# Patient Record
Sex: FEMALE
[De-identification: names, ages, dates, MRNs, and addresses within clinical notes are randomized per-mention and may not be internally consistent; named-entity substitution may affect disease eponyms.]

---

## 2024-09-05 ENCOUNTER — ENROLLMENT (OUTPATIENT)
Dept: HOME HEALTH SERVICES | Facility: HOME HEALTH | Age: 59
End: 2024-09-05
Payer: COMMERCIAL

## 2024-10-03 ENCOUNTER — HOME INFUSION (OUTPATIENT)
Dept: HOME HEALTH SERVICES | Facility: HOME HEALTH | Age: 59
End: 2024-10-03
Payer: COMMERCIAL

## 2024-10-03 DIAGNOSIS — K50.90 CROHN DISEASE (H): ICD-10-CM

## 2024-10-03 DIAGNOSIS — K50.90 CROHN'S DISEASE WITHOUT COMPLICATION, UNSPECIFIED GASTROINTESTINAL TRACT LOCATION (H): Primary | ICD-10-CM

## 2024-10-08 VITALS — WEIGHT: 190 LBS | HEIGHT: 67 IN | BODY MASS INDEX: 29.82 KG/M2

## 2024-10-08 PROBLEM — K50.90 CROHN'S DISEASE (H): Status: ACTIVE | Noted: 2024-10-08

## 2024-10-08 RX ORDER — DIPHENHYDRAMINE HYDROCHLORIDE 50 MG/ML
50 INJECTION INTRAMUSCULAR; INTRAVENOUS PRN
Qty: 99999 ML | Refills: 0 | Status: ACTIVE | OUTPATIENT
Start: 2024-10-23 | End: 2025-02-11

## 2024-10-08 RX ORDER — EPINEPHRINE 0.3 MG/.3ML
0.3 INJECTION SUBCUTANEOUS PRN
Qty: 999999 ML | Refills: 0 | Status: ACTIVE | OUTPATIENT
Start: 2024-10-23 | End: 2025-02-11

## 2024-10-08 RX ORDER — SODIUM CHLORIDE 9 MG/ML
500 INJECTION, SOLUTION INTRAVENOUS PRN
Qty: 999999 ML | Refills: 0 | Status: ACTIVE | OUTPATIENT
Start: 2024-10-23 | End: 2025-02-11

## 2024-10-08 RX ORDER — WATER 10 ML/10ML
40 INJECTION INTRAMUSCULAR; INTRAVENOUS; SUBCUTANEOUS
Qty: 100 ML | Refills: 0 | Status: DISPENSED | OUTPATIENT
Start: 2024-10-23 | End: 2025-02-11

## 2024-10-08 RX ORDER — INFLIXIMAB 100 MG/10ML
400 INJECTION, POWDER, LYOPHILIZED, FOR SOLUTION INTRAVENOUS
Qty: 9 EACH | Refills: 0 | Status: DISPENSED | OUTPATIENT
Start: 2024-10-23 | End: 2025-02-11

## 2024-11-04 ENCOUNTER — HOME INFUSION BILLING (OUTPATIENT)
Dept: HOME HEALTH SERVICES | Facility: HOME HEALTH | Age: 59
End: 2024-11-04
Payer: COMMERCIAL

## 2024-11-04 PROCEDURE — A4930 STERILE, GLOVES PER PAIR: HCPCS

## 2024-11-04 PROCEDURE — A4217 STERILE WATER/SALINE, 500 ML: HCPCS

## 2024-11-04 PROCEDURE — A4213 20+ CC SYRINGE ONLY: HCPCS

## 2024-11-04 PROCEDURE — S1015 IV TUBING EXTENSION SET: HCPCS

## 2024-11-04 PROCEDURE — A4215 STERILE NEEDLE: HCPCS

## 2024-11-06 ENCOUNTER — HOME CARE VISIT (OUTPATIENT)
Dept: HOME HEALTH SERVICES | Facility: HOME HEALTH | Age: 59
End: 2024-11-06
Payer: COMMERCIAL

## 2024-11-06 VITALS
HEART RATE: 70 BPM | DIASTOLIC BLOOD PRESSURE: 68 MMHG | SYSTOLIC BLOOD PRESSURE: 108 MMHG | RESPIRATION RATE: 16 BRPM | TEMPERATURE: 98 F

## 2024-11-06 PROCEDURE — S9359 HIT ANTI-TNF PER DIEM: HCPCS

## 2024-11-06 NOTE — PROGRESS NOTES
"Nursing Visit Note:  Nurse visit today for PIV start and remove once infusion is complete, IV Infliximab 400mg for Crohn s every 8 weeks for Jami Felder.     present during visit today: Not Applicable.    Infusion Nursing Note:  Skilled nurse visit today for PIV start and remove once infusion is complete, IV Infliximab 400mg via gravity for Crohn s  for Jami Felder.   present during visit today: Not Applicable.    Pre-infusion Checklist:   Pre-infusion Checklist    Have you had any delayed reaction since last infusion?   No    Have you recently had an elevated temperature, fever, chills productive cough, coughing for 3 weeks or longer or hemoptysis, abnormal vital signs, night sweats, chest pain, or have you noticed a decrease in your appetite, or noted unexplained weight loss or fatigue?   No    Do you have any open wounds or new incisions?  No    Do you have any recent or upcoming hospitalizations, surgeries, or dental procedures?   No    Do you currently have or recently have had any signs of illness or infection or are you on any antibiotics?   No    Have you had any new, sudden , or worsening abdominal pain?   No    Have you or anyone in your household received a live vaccination in the past 4 weeks?   No    Have you recently been diagnosed with any new nervous system diseases or cancer diagnosis? (i.e., Multiple Sclerosis, Guillain [place], sezures, neurological changes) Are you receiving any form of radiation or chemotherapy?   No    Are you pregnant or breastfeeding, or do you have plans of pregnancy in the future?   No    Have you been having any signs of worsening depression or suicidal ideation?  No    Have there been any other new onset medical symptoms?  No    Did the patient answer \"YES\" to any of the questions above?  No    Will the patient receive a medication that has an order for infusion reaction management?  Yes, and all drugs and supplies are available and none have " .    If ordered, has the patient taken pre-medications?  No    Plan:   Therapy is appropriate, will proceed with treatment.     Chemotherapy Treatment Conditions:   Not Applicable    Intravenous Access:  Peripheral IV placed.    Post Infusion Assessment:  Patient tolerated infusion without incident.     Note: PIV start and remove once infusion is complete   IV Infliximab via gravity to be infused over an hour via gravity, every 8 weeks for Crohn s  patient states medication is effective at this time  Sterile water 4 vials, 10mls each lot number ([id number], exp., 2027)  no premedications ordered  Normal saline syringes that were used today was 40mls  Patient tolerated infusion with no complications       Next Visit Plan:    at 9am, this is ok with patient       Alfredo Ruiz RN 2024       Alfredo Ruiz RN 2024

## 2024-12-30 ENCOUNTER — HOME INFUSION BILLING (OUTPATIENT)
Dept: HOME HEALTH SERVICES | Facility: HOME HEALTH | Age: 59
End: 2024-12-30
Payer: COMMERCIAL

## 2024-12-30 PROCEDURE — S1015 IV TUBING EXTENSION SET: HCPCS

## 2024-12-30 PROCEDURE — A4215 STERILE NEEDLE: HCPCS

## 2024-12-30 PROCEDURE — A4213 20+ CC SYRINGE ONLY: HCPCS

## 2024-12-30 PROCEDURE — A4217 STERILE WATER/SALINE, 500 ML: HCPCS | Mod: JZ

## 2024-12-30 PROCEDURE — A4930 STERILE, GLOVES PER PAIR: HCPCS

## 2025-01-02 ENCOUNTER — HOME CARE VISIT (OUTPATIENT)
Dept: HOME HEALTH SERVICES | Facility: HOME HEALTH | Age: 60
End: 2025-01-02
Payer: COMMERCIAL

## 2025-01-02 ENCOUNTER — TRANSFERRED RECORDS (OUTPATIENT)
Dept: HEALTH INFORMATION MANAGEMENT | Facility: CLINIC | Age: 60
End: 2025-01-02

## 2025-01-02 VITALS
DIASTOLIC BLOOD PRESSURE: 64 MMHG | RESPIRATION RATE: 16 BRPM | SYSTOLIC BLOOD PRESSURE: 118 MMHG | TEMPERATURE: 98.6 F | OXYGEN SATURATION: 96 % | HEART RATE: 71 BPM

## 2025-01-02 PROCEDURE — S9359 HIT ANTI-TNF PER DIEM: HCPCS

## 2025-01-02 NOTE — PROGRESS NOTES
"Nursing Visit Note:  Nurse visit today for Infliximab IV via gravity   PIV start and remove once infusion is completed  for Jami Felder.     present during visit today: Not Applicable.    Infusion Nursing Note:  Skilled nurse visit today for IV Infliximab 400mg via gravity for Jami Felder.   present during visit today: Not Applicable.    Pre-infusion Checklist:   Pre-infusion Checklist    Have you had any delayed reaction since last infusion?   No    Have you recently had an elevated temperature, fever, chills productive cough, coughing for 3 weeks or longer or hemoptysis, abnormal vital signs, night sweats, chest pain, or have you noticed a decrease in your appetite, or noted unexplained weight loss or fatigue?   No    Do you have any open wounds or new incisions?  No    Do you have any recent or upcoming hospitalizations, surgeries, or dental procedures?   No    Do you currently have or recently have had any signs of illness or infection or are you on any antibiotics?   No    Have you had any new, sudden , or worsening abdominal pain?   No    Have you or anyone in your household received a live vaccination in the past 4 weeks?   No    Have you recently been diagnosed with any new nervous system diseases or cancer diagnosis? (i.e., Multiple Sclerosis, Guillain [place], sezures, neurological changes) Are you receiving any form of radiation or chemotherapy?   No    Are you pregnant or breastfeeding, or do you have plans of pregnancy in the future?   No    Have you been having any signs of worsening depression or suicidal ideation?  No    Have there been any other new onset medical symptoms?  No    Did the patient answer \"YES\" to any of the questions above?  No    Will the patient receive a medication that has an order for infusion reaction management?  Yes, and all drugs and supplies are available and none have .    If ordered, has the patient taken pre-medications?  No    Plan: "   Therapy is appropriate, will proceed with treatment.     Chemotherapy Treatment Conditions:   Not Applicable    Intravenous Access:  Peripheral IV placed.    Post Infusion Assessment:  Patient tolerated infusion without incident.  Access discontinued per protocol.       Note: PIV started and removed once infusion is completed   IV Infliximab 400mg via gravity every 8 weeks  Patient states medication is effective at this time  sterile water 4 vials, 10mls each lot number ([id number], exp., 10/2027).        Saline administered (in mLs): 50    Next Visit Plan:   February 27th at 10am, with Lissette CASTELLANOS, everyone is ok with this       Alfredo Ruiz RN 1/2/2025       Alfredo Ruiz RN 1/2/2025

## 2025-02-09 ENCOUNTER — EPISODE UPDATE (OUTPATIENT)
Dept: HOME HEALTH SERVICES | Facility: HOME HEALTH | Age: 60
End: 2025-02-09
Payer: COMMERCIAL

## 2025-02-17 ENCOUNTER — MEDICAL CORRESPONDENCE (OUTPATIENT)
Dept: HOME HEALTH SERVICES | Facility: HOME HEALTH | Age: 60
End: 2025-02-17
Payer: COMMERCIAL

## 2025-02-24 ENCOUNTER — HOME INFUSION BILLING (OUTPATIENT)
Dept: HOME HEALTH SERVICES | Facility: HOME HEALTH | Age: 60
End: 2025-02-24
Payer: COMMERCIAL

## 2025-02-24 PROCEDURE — A4930 STERILE, GLOVES PER PAIR: HCPCS

## 2025-02-24 PROCEDURE — S1015 IV TUBING EXTENSION SET: HCPCS

## 2025-02-24 PROCEDURE — A4213 20+ CC SYRINGE ONLY: HCPCS

## 2025-02-24 PROCEDURE — A4215 STERILE NEEDLE: HCPCS

## 2025-02-24 PROCEDURE — A4217 STERILE WATER/SALINE, 500 ML: HCPCS | Mod: JZ

## 2025-02-27 ENCOUNTER — HOME CARE VISIT (OUTPATIENT)
Dept: HOME HEALTH SERVICES | Facility: HOME HEALTH | Age: 60
End: 2025-02-27
Payer: COMMERCIAL

## 2025-02-27 VITALS
RESPIRATION RATE: 16 BRPM | DIASTOLIC BLOOD PRESSURE: 82 MMHG | BODY MASS INDEX: 29.82 KG/M2 | HEART RATE: 66 BPM | WEIGHT: 190 LBS | TEMPERATURE: 97.1 F | OXYGEN SATURATION: 96 % | SYSTOLIC BLOOD PRESSURE: 124 MMHG

## 2025-02-27 PROCEDURE — S9359 HIT ANTI-TNF PER DIEM: HCPCS

## 2025-02-27 NOTE — PROGRESS NOTES
Nursing Visit Note:  Nurse visit today for Infliximab infusion for Jami Felder.     present during visit today: Not Applicable.    Intravenous Access:  Peripheral IV placed.    Infusion Nursing Note:    Pre-infusion Checklist:   Have you had any delayed reaction since last infusion?   No     Have you recently had an elevated temperature, fever, chills productive cough, coughing for 3 weeks or longer or hemoptysis, abnormal vital signs, night sweats, chest pain, or have you noticed a decrease in your appetite, or noted unexplained weight loss or fatigue?   No     Do you have any open wounds or new incisions?  No     Do you have any recent or upcoming hospitalizations, surgeries, or dental procedures? Does not include esophagogastroduodenoscopy, colonoscopy, endoscopic retrograde cholangiopancreatography (ERCP), endoscopic ultrasound (EUS), dental procedures or joint aspiration/steroid injections.   No     Do you currently have or recently have had any signs of illness or infection or are you on any antibiotics?   No     Have you had any new, sudden, or worsening abdominal pain?   No     Have you or anyone in your household received a live vaccination in the past 4 weeks?   No     Have you recently been diagnosed with any new nervous system diseases or cancer diagnosis? (i.e., Multiple Sclerosis, Guillain [place], seizures, neurological changes) Are you receiving any form of radiation or chemotherapy?   No     Are you pregnant or breastfeeding, or do you have plans of pregnancy in the future?   No     Have you been having any signs of worsening depression or suicidal ideation?  No     Have you had any other new onset medical symptoms?  No    Entyvio/Ocrevus/Tyasabri only: Have you been having any new or worsening medical problems such as issues with thinking, eyesight, balance or strength that have persisted over several days?   N/A    Benlysta only: Have you been having any signs of worsening depression  "or suicidal ideations?    N/A    IVIG only: Have you had any new blood clots?  N/A    Did the patient answer \"YES\" to any of the questions above?  No     Will the patient receive a medication that has an order for infusion reaction management?  Yes, and all drugs and supplies are available and none have .     If ordered, has the patient taken pre-medications?  N/A    Plan:   Therapy is appropriate, will proceed with treatment.     Post Infusion Assessment:  Patient tolerated infusion without incident.  Site patent and intact, free from redness, edema or discomfort.  No evidence of extravasations.  Access discontinued per protocol.  Biologic Infusion Post Education: Call the triage nurse at your clinic or seek medical attention if you have chills and/or temperature greater than or equal to 100.5, uncontrolled nausea/vomiting, diarrhea, constipation, dizziness, shortness of breath, chest pain, heart palpitations, weakness or any other new or concerning symptoms, questions or concerns.  You cannot have any live virus vaccines prior to or during treatment or up to 6 months post infusion.  If you have an upcoming surgery, medical procedure or dental procedure during treatment, this should be discussed with your ordering physician and your surgeon/dentist.  If you are having any concerning symptom, if you are unsure if you should get your next infusion or wish to speak to a provider before your next infusion, please call your care coordinator or triage nurse at your clinic to notify them so we can adequately serve you.     Note: Patient states no flare of Crohns symptoms but seems to have more joint aches more consistently; Use to be the joint aches would be a week before she was due for infusion but when dose got decreased the aches were more consistent. Patient hoping now that dose has increased the joint aches will lessen again.    Saline administered: 40 (ml)    Supply Check:   Does the patient have all the " supplies they need for the next visit?  Yes    Next visit plan: Infliximab infusion 4/24/25    Lissette Kaur, RN 2/27/2025

## 2025-04-18 ENCOUNTER — TELEPHONE (OUTPATIENT)
Dept: HOME HEALTH SERVICES | Facility: HOME HEALTH | Age: 60
End: 2025-04-18

## 2025-04-18 NOTE — TELEPHONE ENCOUNTER
Writer received response from the pt. She would like to know if her CM Alfredo BYRD RN is available for her infusion. TT sent to CM. Annika JENSEN RN, BSN

## 2025-04-18 NOTE — TELEPHONE ENCOUNTER
received an email from pharmacy stating the pt would prefer 9 am for her visit on 4/24/25.  sent a TT to pt to see if 10 am will work as it is nearly a 2 hour drive to pt home. Awaiting response to see if pt is ok with 10 am or if another field nurse will need to see the pt.    Annika JENSEN RN, BSN

## 2025-04-22 ENCOUNTER — HOME INFUSION BILLING (OUTPATIENT)
Dept: HOME HEALTH SERVICES | Facility: HOME HEALTH | Age: 60
End: 2025-04-22
Payer: COMMERCIAL

## 2025-04-22 PROCEDURE — S1015 IV TUBING EXTENSION SET: HCPCS

## 2025-04-22 PROCEDURE — A4215 STERILE NEEDLE: HCPCS

## 2025-04-22 PROCEDURE — A4213 20+ CC SYRINGE ONLY: HCPCS

## 2025-04-24 ENCOUNTER — HOME CARE VISIT (OUTPATIENT)
Dept: HOME HEALTH SERVICES | Facility: HOME HEALTH | Age: 60
End: 2025-04-24
Payer: COMMERCIAL

## 2025-04-24 VITALS
DIASTOLIC BLOOD PRESSURE: 75 MMHG | TEMPERATURE: 98.4 F | WEIGHT: 189 LBS | HEART RATE: 68 BPM | BODY MASS INDEX: 29.66 KG/M2 | RESPIRATION RATE: 18 BRPM | OXYGEN SATURATION: 97 % | SYSTOLIC BLOOD PRESSURE: 113 MMHG

## 2025-04-24 PROCEDURE — S9359 HIT ANTI-TNF PER DIEM: HCPCS

## 2025-04-24 PROCEDURE — A4217 STERILE WATER/SALINE, 500 ML: HCPCS | Mod: JZ

## 2025-04-24 NOTE — PROGRESS NOTES
Nursing Visit Note:  Nurse visit today for Infliximab infusion for Jami Felder.     present during visit today: Not Applicable.    Intravenous Access:  Peripheral IV placed.    Infusion Nursing Note:    Pre-infusion Checklist:   Have you had any delayed reaction since last infusion?   No     Have you recently had an elevated temperature, fever, chills productive cough, coughing for 3 weeks or longer or hemoptysis, abnormal vital signs, night sweats, chest pain, or have you noticed a decrease in your appetite, or noted unexplained weight loss or fatigue?   No     Do you have any open wounds or new incisions?  No     Do you have any recent or upcoming hospitalizations, surgeries, or dental procedures? Does not include esophagogastroduodenoscopy, colonoscopy, endoscopic retrograde cholangiopancreatography (ERCP), endoscopic ultrasound (EUS), dental procedures or joint aspiration/steroid injections.   No     Do you currently have or recently have had any signs of illness or infection or are you on any antibiotics?   No     Have you had any new, sudden, or worsening abdominal pain?   No     Have you or anyone in your household received a live vaccination in the past 4 weeks?   No     Have you recently been diagnosed with any new nervous system diseases or cancer diagnosis? (i.e., Multiple Sclerosis, Guillain [place], seizures, neurological changes) Are you receiving any form of radiation or chemotherapy?   No     Are you pregnant or breastfeeding, or do you have plans of pregnancy in the future?   No     Have you been having any signs of worsening depression or suicidal ideation?  No     Have you had any other new onset medical symptoms?  No    Entyvio/Ocrevus/Tyasabri only: Have you been having any new or worsening medical problems such as issues with thinking, eyesight, balance or strength that have persisted over several days?   N/A    Benlysta only: Have you been having any signs of worsening depression  "or suicidal ideations?    N/A    IVIG only: Have you had any new blood clots?  No    Did the patient answer \"YES\" to any of the questions above?  No     Will the patient receive a medication that has an order for infusion reaction management?  Yes, and all drugs and supplies are available and none have .     If ordered, has the patient taken pre-medications?  N/A    Plan:   Therapy is appropriate, will proceed with treatment.     Post Infusion Assessment:  Patient tolerated infusion without incident.  Site patent and intact, free from redness, edema or discomfort.  No evidence of extravasations.  Access discontinued per protocol.  Biologic Infusion Post Education: Call the triage nurse at your clinic or seek medical attention if you have chills and/or temperature greater than or equal to 100.5, uncontrolled nausea/vomiting, diarrhea, constipation, dizziness, shortness of breath, chest pain, heart palpitations, weakness or any other new or concerning symptoms, questions or concerns.  You cannot have any live virus vaccines prior to or during treatment or up to 6 months post infusion.  If you have an upcoming surgery, medical procedure or dental procedure during treatment, this should be discussed with your ordering physician and your surgeon/dentist.  If you are having any concerning symptom, if you are unsure if you should get your next infusion or wish to speak to a provider before your next infusion, please call your care coordinator or triage nurse at your clinic to notify them so we can adequately serve you.     Note: patient alert and oriented in good spirits. VSS. no c/o pain or any new or worsening sx/symptoms. piv started after 2nd attempt and Infliximab infused without adverse reaction. no other questions or concerns     Saline administered: 40 (ml)    Supply Check:   Does the patient have all the supplies they need for the next visit?  No, Order placed for extra PIV 24 G needles    Next visit plan: " 6/19/25 @ 0900    Prachi Mcbride RN 4/24/2025

## 2025-06-12 ENCOUNTER — HOME INFUSION (OUTPATIENT)
Dept: HOME HEALTH SERVICES | Facility: HOME HEALTH | Age: 60
End: 2025-06-12
Payer: COMMERCIAL

## 2025-06-13 ENCOUNTER — HOME INFUSION BILLING (OUTPATIENT)
Dept: HOME HEALTH SERVICES | Facility: HOME HEALTH | Age: 60
End: 2025-06-13
Payer: COMMERCIAL

## 2025-06-16 ENCOUNTER — HOME INFUSION BILLING (OUTPATIENT)
Dept: HOME HEALTH SERVICES | Facility: HOME HEALTH | Age: 60
End: 2025-06-16
Payer: COMMERCIAL

## 2025-06-16 PROCEDURE — S1015 IV TUBING EXTENSION SET: HCPCS

## 2025-06-16 PROCEDURE — A4213 20+ CC SYRINGE ONLY: HCPCS

## 2025-06-16 PROCEDURE — A4215 STERILE NEEDLE: HCPCS

## 2025-06-19 ENCOUNTER — HOME CARE VISIT (OUTPATIENT)
Dept: HOME HEALTH SERVICES | Facility: HOME HEALTH | Age: 60
End: 2025-06-19
Payer: COMMERCIAL

## 2025-06-19 VITALS
SYSTOLIC BLOOD PRESSURE: 116 MMHG | BODY MASS INDEX: 29.51 KG/M2 | TEMPERATURE: 97.7 F | DIASTOLIC BLOOD PRESSURE: 74 MMHG | RESPIRATION RATE: 16 BRPM | HEART RATE: 60 BPM | WEIGHT: 188 LBS | OXYGEN SATURATION: 99 %

## 2025-06-19 PROCEDURE — A4217 STERILE WATER/SALINE, 500 ML: HCPCS | Mod: JZ

## 2025-06-19 PROCEDURE — S9359 HIT ANTI-TNF PER DIEM: HCPCS

## 2025-06-19 NOTE — PROGRESS NOTES
Nursing Visit Note:  Nurse visit today for PIV start and remove once infusion is completed   IV Infliximab 600mg via gravity  for Jami Felder.     present during visit today: Not Applicable.    Intravenous Access:  Peripheral IV placed.    Infusion Nursing Note:    Pre-infusion Checklist:   Have you had any delayed reaction since last infusion?   No     Have you recently had an elevated temperature, fever, chills productive cough, coughing for 3 weeks or longer or hemoptysis, abnormal vital signs, night sweats, chest pain, or have you noticed a decrease in your appetite, or noted unexplained weight loss or fatigue?   No     Do you have any open wounds or new incisions?  No     Do you have any recent or upcoming hospitalizations, surgeries, or dental procedures? Does not include esophagogastroduodenoscopy, colonoscopy, endoscopic retrograde cholangiopancreatography (ERCP), endoscopic ultrasound (EUS), dental procedures or joint aspiration/steroid injections.   No     Do you currently have or recently have had any signs of illness or infection or are you on any antibiotics?   No     Have you had any new, sudden, or worsening abdominal pain?   No     Have you or anyone in your household received a live vaccination in the past 4 weeks?   No     Have you recently been diagnosed with any new nervous system diseases or cancer diagnosis? (i.e., Multiple Sclerosis, Guillain [place], seizures, neurological changes) Are you receiving any form of radiation or chemotherapy?   No     Are you pregnant or breastfeeding, or do you have plans of pregnancy in the future?   No     Have you been having any signs of worsening depression or suicidal ideation?  No     Have you had any other new onset medical symptoms?  No    Entyvio/Ocrevus/Tyasabri only: Have you been having any new or worsening medical problems such as issues with thinking, eyesight, balance or strength that have persisted over several days?  "  N/A    Benlysta only: Have you been having any signs of worsening depression or suicidal ideations?    N/A    IVIG only: Have you had any new blood clots?  N/A    Did the patient answer \"YES\" to any of the questions above?  No     Will the patient receive a medication that has an order for infusion reaction management?  Yes, and all drugs and supplies are available and none have .     If ordered, has the patient taken pre-medications?  N/A    Plan:   Therapy is appropriate, will proceed with treatment.     Post Infusion Assessment:  Patient tolerated infusion without incident.     Note: PIV start and remove once infusion is completed   IV Infliximab 600mg via gravity every 8 weeks for Crohn s  Patient states medication is effective at this time   Sterile water used today, 6 vials, 10mls each all same lot number ([id number], exp., 10/2027)  No premedication orders     Saline administered: 40 (ml)    Supply Check:   Does the patient have all the supplies they need for the next visit?  Yes    Next visit plan:  at 9am, this is ok with patient     Alfredo Ruiz RN 2025  "

## 2025-08-07 ENCOUNTER — HOME INFUSION (OUTPATIENT)
Dept: HOME HEALTH SERVICES | Facility: HOME HEALTH | Age: 60
End: 2025-08-07
Payer: COMMERCIAL

## 2025-08-12 ENCOUNTER — HOME INFUSION BILLING (OUTPATIENT)
Dept: HOME HEALTH SERVICES | Facility: HOME HEALTH | Age: 60
End: 2025-08-12
Payer: COMMERCIAL

## 2025-08-12 PROCEDURE — A4215 STERILE NEEDLE: HCPCS

## 2025-08-12 PROCEDURE — A4213 20+ CC SYRINGE ONLY: HCPCS

## 2025-08-14 ENCOUNTER — HOME CARE VISIT (OUTPATIENT)
Dept: HOME HEALTH SERVICES | Facility: HOME HEALTH | Age: 60
End: 2025-08-14
Payer: COMMERCIAL

## 2025-08-14 VITALS
DIASTOLIC BLOOD PRESSURE: 70 MMHG | OXYGEN SATURATION: 98 % | HEART RATE: 65 BPM | BODY MASS INDEX: 28.41 KG/M2 | SYSTOLIC BLOOD PRESSURE: 112 MMHG | WEIGHT: 181 LBS | TEMPERATURE: 98.4 F | RESPIRATION RATE: 16 BRPM

## 2025-08-14 PROCEDURE — S9359 HIT ANTI-TNF PER DIEM: HCPCS

## 2025-08-14 PROCEDURE — A4217 STERILE WATER/SALINE, 500 ML: HCPCS | Mod: JZ

## 2025-12-17 ENCOUNTER — MEDICAL CORRESPONDENCE (OUTPATIENT)
Dept: HOME HEALTH SERVICES | Facility: HOME HEALTH | Age: 60
End: 2025-12-17
Payer: COMMERCIAL